# Patient Record
Sex: FEMALE
[De-identification: names, ages, dates, MRNs, and addresses within clinical notes are randomized per-mention and may not be internally consistent; named-entity substitution may affect disease eponyms.]

---

## 2019-04-13 ENCOUNTER — HOSPITAL ENCOUNTER (INPATIENT)
Dept: HOSPITAL 14 - H.ER | Age: 29
LOS: 1 days | Discharge: HOME | DRG: 440 | End: 2019-04-14
Attending: FAMILY MEDICINE | Admitting: FAMILY MEDICINE
Payer: COMMERCIAL

## 2019-04-13 VITALS — BODY MASS INDEX: 18.4 KG/M2

## 2019-04-13 DIAGNOSIS — K85.90: Primary | ICD-10-CM

## 2019-04-13 DIAGNOSIS — Z88.5: ICD-10-CM

## 2019-04-13 LAB
ALBUMIN SERPL-MCNC: 4.3 G/DL (ref 3.5–5)
ALBUMIN/GLOB SERPL: 1.5 {RATIO} (ref 1–2.1)
ALT SERPL-CCNC: 21 U/L (ref 9–52)
AST SERPL-CCNC: 22 U/L (ref 14–36)
BACTERIA #/AREA URNS HPF: (no result) /[HPF]
BASOPHILS # BLD AUTO: 0 K/UL (ref 0–0.2)
BASOPHILS NFR BLD: 0.4 % (ref 0–2)
BILIRUB UR-MCNC: NEGATIVE MG/DL
BUN SERPL-MCNC: 9 MG/DL (ref 7–17)
CALCIUM SERPL-MCNC: 9 MG/DL (ref 8.4–10.2)
COLOR UR: YELLOW
EOSINOPHIL # BLD AUTO: 0.1 K/UL (ref 0–0.7)
EOSINOPHIL NFR BLD: 1 % (ref 0–7)
EOSINOPHIL NFR BLD: 1.6 % (ref 0–4)
ERYTHROCYTE [DISTWIDTH] IN BLOOD BY AUTOMATED COUNT: 14.3 % (ref 11.5–14.5)
GFR NON-AFRICAN AMERICAN: > 60
GLUCOSE UR STRIP-MCNC: (no result) MG/DL
HGB BLD-MCNC: 10.2 G/DL (ref 12–16)
HYPOCHROMIC: SLIGHT
LEUKOCYTE ESTERASE UR-ACNC: (no result) LEU/UL
LIPASE SERPL-CCNC: 797 U/L (ref 23–300)
LYMPHOCYTE: 12 % (ref 20–50)
LYMPHOCYTES # BLD AUTO: 0.7 K/UL (ref 1–4.3)
LYMPHOCYTES NFR BLD AUTO: 9.7 % (ref 20–40)
MCH RBC QN AUTO: 20 PG (ref 27–31)
MCHC RBC AUTO-ENTMCNC: 31.3 G/DL (ref 33–37)
MCV RBC AUTO: 63.9 FL (ref 81–99)
MICROCYTES BLD QL SMEAR: (no result)
MONOCYTE: 6 % (ref 0–10)
MONOCYTES # BLD: 0.5 K/UL (ref 0–0.8)
MONOCYTES NFR BLD: 6.5 % (ref 0–10)
NEUTROPHILS # BLD: 6.2 K/UL (ref 1.8–7)
NEUTROPHILS NFR BLD AUTO: 80 % (ref 42–75)
NEUTROPHILS NFR BLD AUTO: 81.8 % (ref 50–75)
NRBC BLD AUTO-RTO: 0 % (ref 0–0)
OVALOCYTES BLD QL SMEAR: SLIGHT
PH UR STRIP: 6 [PH] (ref 5–8)
PLATELET # BLD EST: NORMAL 10*3/UL
PLATELET # BLD: 238 K/UL (ref 130–400)
PMV BLD AUTO: 9.7 FL (ref 7.2–11.7)
PROT UR STRIP-MCNC: NEGATIVE MG/DL
RBC # BLD AUTO: 5.08 MIL/UL (ref 3.8–5.2)
RBC # UR STRIP: NEGATIVE /UL
SCHISTOCYTES BLD QL SMEAR: SLIGHT
SP GR UR STRIP: 1 (ref 1–1.03)
SQUAMOUS EPITHIAL: < 1 /HPF (ref 0–5)
TEARDROP CELLS: SLIGHT
TOTAL CELLS COUNTED BLD: 100
URINE CLARITY: (no result)
UROBILINOGEN UR-MCNC: (no result) MG/DL (ref 0.2–1)
VARIANT LYMPHS NFR BLD MANUAL: 1 % (ref 0–0)
WBC # BLD AUTO: 7.6 K/UL (ref 4.8–10.8)

## 2019-04-13 NOTE — CT
Date of service: 



04/13/2019



PROCEDURE:  CT Abdomen and Pelvis with contrast



HISTORY:

abdominal pain, pancreatitis



COMPARISON:

None.



TECHNIQUE:

Contrast dose: 95 mL Omnipaque 300



Radiation dose:



Total exam DLP = 307.57 mGy-cm.



This CT exam was performed using one or more of the following dose 

reduction techniques: Automated exposure control, adjustment of the 

mA and/or kV according to patient size, and/or use of iterative 

reconstruction technique.



FINDINGS:



LOWER THORAX:

Unremarkable. 



LIVER:

Unremarkable. No gross lesion or ductal dilatation. 



GALLBLADDER AND BILE DUCTS:

Unremarkable. 



PANCREAS:

Trace peripancreatic stranding adjacent to the proximal body.



SPLEEN:

Unremarkable. 



ADRENALS:

Unremarkable. No mass. 



KIDNEYS AND URETERS:

Unremarkable. No hydronephrosis. No solid mass. 



VASCULATURE:

Unremarkable. No aortic aneurysm. No aortic atherosclerotic 

calcification or mural plaque present.



BOWEL:

Unremarkable. No obstruction. No gross mural thickening. 



APPENDIX:

Normal appendix. 



PERITONEUM:

Unremarkable. No free fluid. No free air. 



LYMPH NODES:

Unremarkable. No enlarged lymph nodes. 



BLADDER:

Unremarkable. 



REPRODUCTIVE:

Small amount pelvic fluid, likely physiologic 



BONES:

No acute fracture. 



OTHER FINDINGS:

None.



IMPRESSION:

Trace peripancreatic stranding compatible with acute pancreatitis. 



Small amount of fluid in the pelvis, likely physiologic.

## 2019-04-13 NOTE — ED PDOC
HPI: Abdomen


Time Seen by Provider: 04/13/19 09:46


Chief Complaint (Nursing): Abdominal Pain


Chief Complaint (Provider): Abdominal Pain


History Per: Patient


History/Exam Limitations: no limitations


Onset/Duration Of Symptoms: Days (x2)


Current Symptoms Are (Timing): Still Present


Additional Complaint(s): 


28 year old female presents to the emergency department with a complaint of 

diffuse abdominal pain that started at work last night. She reports 10/10 pain 

upon onset which lasted for 2 hours. Patient states she had improvement of pain 

overnight, however, symptoms did not fully resolve, pain now moderate and 

constant. She was evaluated at an Urgent Care then advised to go to ED for 

further evaluation. Otherwise, she denies fever, chills, nausea, or vomiting. 





PCP: Dr. Grecia Barger 





Past Medical History


Reviewed: Historical Data, Nursing Documentation, Vital Signs


Vital Signs: 





                                Last Vital Signs











Temp  99 F   04/13/19 09:57


 


Pulse  98 H  04/13/19 09:57


 


Resp  17   04/13/19 09:57


 


BP  136/78   04/13/19 09:57


 


Pulse Ox  100   04/13/19 09:57














- Medical History


PMH: No Chronic Diseases





- Surgical History


Surgical History: No Surg Hx





- Family History


Family History: States: Unknown Family Hx





- Allergies


Allergies/Adverse Reactions: 


                                    Allergies











Allergy/AdvReac Type Severity Reaction Status Date / Time


 


morphine Allergy  RASH Verified 04/13/19 10:22














Review of Systems


ROS Statement: Except As Marked, All Systems Reviewed And Found Negative


Constitutional: Negative for: Fever, Chills


Gastrointestinal: Positive for: Abdominal Pain (diffuse).  Negative for: Nausea,

Vomiting





Physical Exam





- Reviewed


Nursing Documentation Reviewed: Yes


Vital Signs Reviewed: Yes





- Physical Exam


Appears: Positive for: No Acute Distress


Head Exam: Positive for: ATRAUMATIC, NORMAL INSPECTION, NORMOCEPHALIC


Skin: Positive for: Normal Color


Eye Exam: Positive for: Normal appearance


ENT: Positive for: Normal ENT Inspection.  Negative for: Pharyngeal Erythema


Neck: Positive for: Normal


Cardiovascular/Chest: Positive for: Regular Rate, Rhythm


Respiratory: Positive for: Normal Breath Sounds.  Negative for: Respiratory 

Distress


Gastrointestinal/Abdominal: Positive for: Soft.  Negative for: Tenderness 

(diffuse, mild. (+) moderate tenderness in epigastrum)


Back: Positive for: Normal Inspection.  Negative for: L CVA Tenderness, R CVA 

Tenderness


Extremity: Positive for: Normal ROM (upper/lower)


Neurological/Psych: Positive for: Awake, Alert, Normal Tone





- Laboratory Results


Result Diagrams: 


                                 04/13/19 10:49





                                 04/13/19 10:49


Lab Results: 





                                        











Total Bilirubin  0.6 mg/dl (0.2-1.3)   04/13/19  10:49    


 


AST  22 U/L (14-36)   04/13/19  10:49    


 


ALT  21 U/L (9-52)   04/13/19  10:49    


 


Alkaline Phosphatase  53 U/L ()   04/13/19  10:49    


 


Total Protein  7.1 G/DL (6.3-8.2)   04/13/19  10:49    


 


Albumin  4.3 g/dL (3.5-5.0)   04/13/19  10:49    


 


Globulin  2.8 gm/dL (2.2-3.9)   04/13/19  10:49    


 


Albumin/Globulin Ratio  1.5  (1.0-2.1)   04/13/19  10:49    








                                        











Lipase  797 U/L ()  H  04/13/19  10:49    








                                        











Urine Color  Yellow  (YELLOW)   04/13/19  10:49    


 


Urine Clarity  Slighty-cloudy  (Clear)   04/13/19  10:49    


 


Urine pH  6.0  (5.0-8.0)   04/13/19  10:49    


 


Ur Specific Gravity  1.005  (1.003-1.030)   04/13/19  10:49    


 


Urine Protein  Negative mg/dL (NEGATIVE)   04/13/19  10:49    


 


Urine Glucose (UA)  Neg mg/dL (NEGATIVE)   04/13/19  10:49    


 


Urine Ketones  Negative mg/dL (NEGATIVE)   04/13/19  10:49    


 


Urine Blood  Negative  (NEGATIVE)   04/13/19  10:49    


 


Urine Nitrate  Negative  (NEGATIVE)   04/13/19  10:49    


 


Urine Bilirubin  Negative  (NEGATIVE)   04/13/19  10:49    


 


Urine Urobilinogen  0.2-1.0 mg/dL (0.2-1.0)   04/13/19  10:49    


 


Ur Leukocyte Esterase  Neg Franklyn/uL (Negative)   04/13/19  10:49    


 


Urine RBC (Auto)  < 1 /hpf (0-3)   04/13/19  10:49    


 


Urine Microscopic WBC  1 /hpf (0-5)   04/13/19  10:49    


 


Ur Squamous Epith Cells  < 1 /hpf (0-5)   04/13/19  10:49    


 


Urine Bacteria  Occ  (<OCC)  H  04/13/19  10:49    














- ECG


O2 Sat by Pulse Oximetry: 100 (RA)


Pulse Ox Interpretation: Normal





Medical Decision Making


Medical Decision Making: 


Time: 1027


Initial Plan:


* Labs with UA


* IV fluids


* Toradol IVP


* NPO





Time: 1140


--Labs reviewed: elevated lipase at 797 mg/dL. work-up for possible 

pancreatitis.  Pt. reports social drinking, last drank 2d. ago.


 


IV CT ordered.  





Pt. feeling better but still with mild diffuse upper abdominal pain.  





Case d/w covering for Malin and arrangements made to admit under service of 

Dr. Zapata.  Pt. agreeable to plan





----

--------------------------------------------------------------------------------


-------------


Scribe Attestation:


Documented by Alka Pacheco, acting as a scribe for Maria Esther Santana PA-C.


Provider Scribe Attestation:


All medical record entries made by the Scribe were at my direction and persona

lly dictated by me. I have reviewed the chart and agree that the record 

accurately reflects my personal performance of the history, physical exam, 

medical decision making, and the department course for this patient. I have also

personally directed, reviewed, and agree with the discharge instructions and 

disposition.





Disposition





- Clinical Impression


Clinical Impression: 


 Pancreatitis








- Patient ED Disposition


Is Patient to be Admitted: Yes


Counseled Patient/Family Regarding: Studies Performed, Diagnosis





- Disposition


Disposition Time: 13:13


Condition: STABLE


Forms:  [place] (English)

## 2019-04-14 VITALS
HEART RATE: 73 BPM | SYSTOLIC BLOOD PRESSURE: 124 MMHG | OXYGEN SATURATION: 99 % | RESPIRATION RATE: 18 BRPM | DIASTOLIC BLOOD PRESSURE: 82 MMHG | TEMPERATURE: 97.8 F

## 2019-04-14 LAB
ALBUMIN SERPL-MCNC: 3.3 G/DL (ref 3.5–5)
ALBUMIN/GLOB SERPL: 1.4 {RATIO} (ref 1–2.1)
ALT SERPL-CCNC: 24 U/L (ref 9–52)
AST SERPL-CCNC: 19 U/L (ref 14–36)
BASOPHILS # BLD AUTO: 0 K/UL (ref 0–0.2)
BASOPHILS NFR BLD: 0.4 % (ref 0–2)
BUN SERPL-MCNC: 4 MG/DL (ref 7–17)
CALCIUM SERPL-MCNC: 8.1 MG/DL (ref 8.4–10.2)
EOSINOPHIL # BLD AUTO: 0.2 K/UL (ref 0–0.7)
EOSINOPHIL NFR BLD: 3.2 % (ref 0–4)
ERYTHROCYTE [DISTWIDTH] IN BLOOD BY AUTOMATED COUNT: 14.1 % (ref 11.5–14.5)
GFR NON-AFRICAN AMERICAN: > 60
HGB BLD-MCNC: 9.1 G/DL (ref 12–16)
LIPASE SERPL-CCNC: 40 U/L (ref 23–300)
LYMPHOCYTES # BLD AUTO: 1.3 K/UL (ref 1–4.3)
LYMPHOCYTES NFR BLD AUTO: 23.7 % (ref 20–40)
MCH RBC QN AUTO: 19.9 PG (ref 27–31)
MCHC RBC AUTO-ENTMCNC: 31 G/DL (ref 33–37)
MCV RBC AUTO: 64.1 FL (ref 81–99)
MONOCYTES # BLD: 0.4 K/UL (ref 0–0.8)
MONOCYTES NFR BLD: 8 % (ref 0–10)
NEUTROPHILS # BLD: 3.5 K/UL (ref 1.8–7)
NEUTROPHILS NFR BLD AUTO: 64.7 % (ref 50–75)
NRBC BLD AUTO-RTO: 0 % (ref 0–0)
PLATELET # BLD: 222 K/UL (ref 130–400)
PMV BLD AUTO: 10 FL (ref 7.2–11.7)
RBC # BLD AUTO: 4.58 MIL/UL (ref 3.8–5.2)
WBC # BLD AUTO: 5.4 K/UL (ref 4.8–10.8)

## 2019-04-14 NOTE — HP
REFERRING PHYSICIAN:  Jose Alexander of the [facility].



HISTORY OF PRESENT ILLNESS:  This is a very pleasant 28-year-old young

lady, who presented with acute onset yesterday about 10 p.m. with

epigastric pain and discomfort that was not associated with any fever,

chills, nausea, or vomiting or any diarrhea.  She came to the emergency

room this morning and was found to have a lipase level of 797, she was

admitted with acute pancreatitis and referred to me for further evaluation.

At the time of my evaluation, she was lying very comfortably in bed and

very nontoxic appearing, chatting with her friend.  She states she does

have some pain and discomfort, but not enough to require any pain

medication at the moment.  She has not had at any time any other

symptomatology other than this pain.  It was band like in the upper abdomen

earlier and now it is just in the epigastric area.  She has never had any

history of pancreatitis or GI disease in the past.



ALLERGIES:  SHE HAS AN ALLERGY TO MORPHINE, SHE STATES.



MEDICATIONS:  At home was noncontributory.



PAST MEDICAL HISTORY:  Negative.



PAST SURGICAL HISTORY:  Noncontributory.



FAMILY HISTORY:  She said her grandfather had pancreatitis last October.



SOCIAL HISTORY:  She denies any tobacco or drug use and only occasional

alcohol, she states it is only, may be one drink at night because she is

working as a , does not have the time to drink.



PHYSICAL EXAMINATION:

GENERAL:  She is a well-developed and well-nourished young woman, awake,

alert, and oriented x3, in no acute distress whatsoever.

VITAL SIGNS:  Stable.  She is afebrile.

ABDOMEN:  Soft, positive bowel sounds.  There is some tenderness on

palpation in the upper abdomen, epigastric area but no palpable masses or

lesions.



LABORATORY DATA:  Her laboratories were reviewed.  Her CBC is remarkable

for hemoglobin of 10.2 with hematocrit of 32.5.  Otherwise, it is

unremarkable.  SMA-7 is unremarkable.  LFTs were all normal, lipase 797. 

CAT scan of the abdomen and pelvis is unremarkable except for trace

peripancreatic stranding compatible with acute pancreatitis.



IMPRESSION AND PLAN:  A 28-year-old young lady with acute pancreatitis of

unclear etiology.  There does not appear to be any gallstones on the CAT

scan.  She is not a heavy drinker.  She has had a some type of viral

syndrome or cold recently, questionable whether this is viral-induced

pancreatitis.  Be that it is _____ she does look quite comfortable.  I will

simply keep her nothing by mouth, aggressive intravenous hydration since

she has no other medical problems, and pain control.  She does think she

has MORPHINE ALLERGY, but she has taken Percocet in the past without any

allergic reaction.  Therefore, I will simply use Percocet one tablet every

6 hours on an as needed basis based on her telling me that she has taken it

in the past without any issues.  Therefore, we would stay away from

Morphine per se or Dilaudid.  She can have some ice chips.  Otherwise, she

is nothing by mouth.  Repeat the labs in the morning and then we can start

her diet depending on how she does from today to tomorrow.



I will follow along with you.  Thank you very much for this referral.







__________________________________________

Severino Chou MD





DD:  04/13/2019 19:06:39

DT:  04/13/2019 22:07:36

Job # [id number]

## 2019-04-14 NOTE — CP.PCM.DIS
Provider





- Provider


Date of Admission: 


04/13/19 13:03





Attending physician: 


Cristino Zapata MD





Consults: 








04/13/19 13:03


Gastroenterology Consult Stat 


   Comment: 


   Consulting Provider: Clarence Qureshi


   Consulting Physician: Clarence Qureshi


   Reason for Consult: pancreatitis














Diagnosis





- Discharge Diagnosis


(1) DVT prophylaxis


Status: Acute   





(2) Pancreatitis


Status: Acute   





Hospital Course





- Lab Results


Lab Results: 


                             Most Recent Lab Values











WBC  5.4 K/uL (4.8-10.8)   04/14/19  05:25    


 


RBC  4.58 Mil/uL (3.80-5.20)   04/14/19  05:25    


 


Hgb  9.1 g/dL (12.0-16.0)  L  04/14/19  05:25    


 


Hct  29.4 % (34.0-47.0)  L  04/14/19  05:25    


 


MCV  64.1 fl (81.0-99.0)  L  04/14/19  05:25    


 


MCH  19.9 pg (27.0-31.0)  L  04/14/19  05:25    


 


MCHC  31.0 g/dL (33.0-37.0)  L  04/14/19  05:25    


 


RDW  14.1 % (11.5-14.5)   04/14/19  05:25    


 


Plt Count  222 K/uL (130-400)   04/14/19  05:25    


 


MPV  10.0 fl (7.2-11.7)   04/14/19  05:25    


 


Neut % (Auto)  64.7 % (50.0-75.0)   04/14/19  05:25    


 


Lymph % (Auto)  23.7 % (20.0-40.0)   04/14/19  05:25    


 


Mono % (Auto)  8.0 % (0.0-10.0)   04/14/19  05:25    


 


Eos % (Auto)  3.2 % (0.0-4.0)   04/14/19  05:25    


 


Baso % (Auto)  0.4 % (0.0-2.0)   04/14/19  05:25    


 


Neut # (Auto)  3.5 K/uL (1.8-7.0)   04/14/19  05:25    


 


Lymph # (Auto)  1.3 K/uL (1.0-4.3)   04/14/19  05:25    


 


Mono # (Auto)  0.4 K/uL (0.0-0.8)   04/14/19  05:25    


 


Eos # (Auto)  0.2 K/uL (0.0-0.7)   04/14/19  05:25    


 


Baso # (Auto)  0.0 K/uL (0.0-0.2)   04/14/19  05:25    


 


Neutrophils % (Manual)  80 % (42-75)  H  04/13/19  10:49    


 


Lymphocytes % (Manual)  12 % (20-50)  L  04/13/19  10:49    


 


Reactive Lymphs %  1 % (0-0)  H  04/13/19  10:49    


 


Monocytes % (Manual)  6 % (0-10)   04/13/19  10:49    


 


Eosinophils % (Manual)  1 % (0-7)   04/13/19  10:49    


 


Platelet Estimate  Normal  (NORMAL)   04/13/19  10:49    


 


Hypochromasia (manual)  Slight   04/13/19  10:49    


 


Microcytosis (manual)  Moderate   04/13/19  10:49    


 


Tear Drop Cells  Slight   04/13/19  10:49    


 


Ovalocytes  Slight   04/13/19  10:49    


 


Schistocytes  Slight   04/13/19  10:49    


 


Sodium  137 mmol/l (132-148)   04/14/19  05:25    


 


Potassium  3.7 MMOL/L (3.6-5.0)   04/14/19  05:25    


 


Chloride  105 mmol/L ()   04/14/19  05:25    


 


Carbon Dioxide  20 mmol/L (22-30)  L  04/14/19  05:25    


 


Anion Gap  16  (10-20)   04/14/19  05:25    


 


BUN  4 mg/dl (7-17)  L  04/14/19  05:25    


 


Creatinine  0.5 mg/dl (0.7-1.2)  L  04/14/19  05:25    


 


Est GFR ( Amer)  > 60   04/14/19  05:25    


 


Est GFR (Non-Af Amer)  > 60   04/14/19  05:25    


 


Random Glucose  58 mg/dL ()  L  04/14/19  05:25    


 


Calcium  8.1 mg/dL (8.4-10.2)  L  04/14/19  05:25    


 


Total Bilirubin  0.6 mg/dl (0.2-1.3)   04/14/19  05:25    


 


AST  19 U/L (14-36)   04/14/19  05:25    


 


ALT  24 U/L (9-52)   04/14/19  05:25    


 


Alkaline Phosphatase  44 U/L ()   04/14/19  05:25    


 


Total Protein  5.7 G/DL (6.3-8.2)  L  04/14/19  05:25    


 


Albumin  3.3 g/dL (3.5-5.0)  L D 04/14/19  05:25    


 


Globulin  2.4 gm/dL (2.2-3.9)   04/14/19  05:25    


 


Albumin/Globulin Ratio  1.4  (1.0-2.1)   04/14/19  05:25    


 


Lipase  40 U/L ()   04/14/19  05:25    


 


Urine Color  Yellow  (YELLOW)   04/13/19  10:49    


 


Urine Clarity  Slighty-cloudy  (Clear)   04/13/19  10:49    


 


Urine pH  6.0  (5.0-8.0)   04/13/19  10:49    


 


Ur Specific Gravity  1.005  (1.003-1.030)   04/13/19  10:49    


 


Urine Protein  Negative mg/dL (NEGATIVE)   04/13/19  10:49    


 


Urine Glucose (UA)  Neg mg/dL (NEGATIVE)   04/13/19  10:49    


 


Urine Ketones  Negative mg/dL (NEGATIVE)   04/13/19  10:49    


 


Urine Blood  Negative  (NEGATIVE)   04/13/19  10:49    


 


Urine Nitrate  Negative  (NEGATIVE)   04/13/19  10:49    


 


Urine Bilirubin  Negative  (NEGATIVE)   04/13/19  10:49    


 


Urine Urobilinogen  0.2-1.0 mg/dL (0.2-1.0)   04/13/19  10:49    


 


Ur Leukocyte Esterase  Neg Franklyn/uL (Negative)   04/13/19  10:49    


 


Urine RBC (Auto)  < 1 /hpf (0-3)   04/13/19  10:49    


 


Urine Microscopic WBC  1 /hpf (0-5)   04/13/19  10:49    


 


Ur Squamous Epith Cells  < 1 /hpf (0-5)   04/13/19  10:49    


 


Urine Bacteria  Occ  (<OCC)  H  04/13/19  10:49    














Discharge Exam





- Head Exam


Head Exam: ATRAUMATIC, NORMAL INSPECTION, NORMOCEPHALIC





Discharge Plan





- Follow Up Plan


Condition: STABLE


Disposition: HOME/ ROUTINE


Instructions:  Pancreatitis, How to Wash Your Hands Properly


Additional Instructions: 


follow up with Dr Qureshi next week

## 2019-04-14 NOTE — CP.PCM.HP
History of Present Illness





- History of Present Illness


History of Present Illness: 





pt admitted for pancreatitis. had abd pain and nausda but is not resolved. no 

f/c, n/v/d at present. painc ontrolled. lipase on arrival noted nad today is 40.

gi consult appriciated and cleared for adv of diet nad dc if tolerated.








Present on Admission





- Present on Admission


Any Indicators Present on Admission: No





Review of Systems





- Gastrointestinal


Gastrointestinal: As Per HPI, Abdominal Pain, Nausea





Past Patient History





- Past Medical History & Family History


Past Medical History?: Yes





- Past Social History


Smoking Status: Never Smoked





- CARDIAC


Hx Cardiac Disorders: No





- PULMONARY


Hx Respiratory Disorders: No





- NEUROLOGICAL


Other/Comment: Hx of Dyslexia





- HEENT


Hx HEENT Problems: No





- RENAL


Hx Chronic Kidney Disease: No





- ENDOCRINE/METABOLIC


Hx Endocrine Disorders: No





- HEMATOLOGICAL/ONCOLOGICAL


Other/Comment: Thalasemia Trait





- MUSCULOSKELETAL/RHEUMATOLOGICAL


Hx Falls: No





- GASTROINTESTINAL


Other/Comment: Acid Reflux





- GENITOURINARY/GYNECOLOGICAL


Hx Sexually Transmitted Disorders: Yes


Other/Comment: Hx of Ruptured Ovarian Cysts, Culposcopy X 3





- PSYCHIATRIC


Hx Anxiety: Yes


Hx Substance Use: No





- SURGICAL HISTORY


Hx Surgeries: Yes


Other/Comment: knee surgery





- ANESTHESIA


Hx Anesthesia: Yes


Hx Anesthesia Reactions: No





Meds


Allergies/Adverse Reactions: 


                                    Allergies











Allergy/AdvReac Type Severity Reaction Status Date / Time


 


morphine Allergy  RASH Verified 04/13/19 10:22














Physical Exam





- Constitutional


Appears: Well, Non-toxic, No Acute Distress





- Head Exam


Head Exam: ATRAUMATIC, NORMAL INSPECTION, NORMOCEPHALIC





- Eye Exam


Eye Exam: EOMI, Normal appearance, PERRL


Pupil Exam: NORMAL ACCOMODATION, PERRL





- ENT Exam


ENT Exam: Mucous Membranes Moist, Normal Exam





- Neck Exam


Neck exam: Positive for: Normal Inspection





- Respiratory Exam


Respiratory Exam: Clear to Auscultation Bilateral, NORMAL BREATHING PATTERN





- Cardiovascular Exam


Cardiovascular Exam: REGULAR RHYTHM, RRR, +S1, +S2





- GI/Abdominal Exam


GI & Abdominal Exam: Normal Bowel Sounds, Soft.  absent: Tenderness





- Extremities Exam


Extremities exam: Positive for: full ROM, normal capillary refill, normal 

inspection, pedal pulses present





- Back Exam


Back exam: NORMAL INSPECTION





- Neurological Exam


Neurological exam: Alert, CN II-XII Intact, Normal Gait, Oriented x3, Reflexes 

Normal





- Psychiatric Exam


Psychiatric exam: Normal Affect, Normal Mood





- Skin


Skin Exam: Dry, Intact, Normal Color, Warm





Results





- Vital Signs


Recent Vital Signs: 





                                Last Vital Signs











Temp  97.8 F   04/14/19 16:36


 


Pulse  73   04/14/19 16:36


 


Resp  18   04/14/19 16:36


 


BP  124/82   04/14/19 16:36


 


Pulse Ox  99   04/14/19 16:36














- Labs


Result Diagrams: 


                                 04/14/19 05:25





                                 04/14/19 05:25


Labs: 





                         Laboratory Results - last 24 hr











  04/14/19 04/14/19





  05:25 05:25


 


WBC  5.4 


 


RBC  4.58 


 


Hgb  9.1 L 


 


Hct  29.4 L 


 


MCV  64.1 L 


 


MCH  19.9 L 


 


MCHC  31.0 L 


 


RDW  14.1 


 


Plt Count  222 


 


MPV  10.0 


 


Neut % (Auto)  64.7 


 


Lymph % (Auto)  23.7 


 


Mono % (Auto)  8.0 


 


Eos % (Auto)  3.2 


 


Baso % (Auto)  0.4 


 


Neut # (Auto)  3.5 


 


Lymph # (Auto)  1.3 


 


Mono # (Auto)  0.4 


 


Eos # (Auto)  0.2 


 


Baso # (Auto)  0.0 


 


Sodium   137


 


Potassium   3.7


 


Chloride   105


 


Carbon Dioxide   20 L


 


Anion Gap   16


 


BUN   4 L


 


Creatinine   0.5 L


 


Est GFR ( Amer)   > 60


 


Est GFR (Non-Af Amer)   > 60


 


Random Glucose   58 L


 


Calcium   8.1 L


 


Total Bilirubin   0.6


 


AST   19


 


ALT   24


 


Alkaline Phosphatase   44


 


Total Protein   5.7 L


 


Albumin   3.3 L D


 


Globulin   2.4


 


Albumin/Globulin Ratio   1.4


 


Lipase   40














Assessment & Plan


(1) DVT prophylaxis


Assessment and Plan: 


scd and ae hose


ambulation


Status: Acute   





(2) Pancreatitis


Assessment and Plan: 


ivf


gi


full liquid today after lipase noted


adv to low fat


to be cleared for dc as per gi


pain and nausea control


Status: Acute   





Decision To Admit





- Pt Status Changed To:


Hospital Disposition Of: Inpatient





- Admit Certification


Admit to Inpatient:: After my assessment, the patient will require hosp

italization for at least two midnights.  This is because of the severity of 

symptoms shown, intensity of services needed, and/or the medical risk in this 

patient being treated as an outpatient.





- .


Bed Request Type: Med/Surg


Admitting Physician: Cristino Zapata